# Patient Record
Sex: FEMALE | Race: WHITE | HISPANIC OR LATINO | Employment: FULL TIME | URBAN - METROPOLITAN AREA
[De-identification: names, ages, dates, MRNs, and addresses within clinical notes are randomized per-mention and may not be internally consistent; named-entity substitution may affect disease eponyms.]

---

## 2023-05-12 ENCOUNTER — TELEPHONE (OUTPATIENT)
Dept: PRIMARY CARE | Facility: CLINIC | Age: 38
End: 2023-05-12
Payer: COMMERCIAL

## 2023-05-12 DIAGNOSIS — L30.9 DERMATITIS: Primary | ICD-10-CM

## 2023-05-12 DIAGNOSIS — F41.1 GAD (GENERALIZED ANXIETY DISORDER): ICD-10-CM

## 2023-05-12 DIAGNOSIS — R41.840 CONCENTRATION DEFICIT: ICD-10-CM

## 2023-05-12 RX ORDER — BUPROPION HYDROCHLORIDE 150 MG/1
150 TABLET ORAL EVERY MORNING
Qty: 90 TABLET | Refills: 2 | Status: SHIPPED | OUTPATIENT
Start: 2023-05-12 | End: 2023-08-31 | Stop reason: ALTCHOICE

## 2023-05-12 RX ORDER — CLOTRIMAZOLE AND BETAMETHASONE DIPROPIONATE 10; .64 MG/G; MG/G
1 CREAM TOPICAL 2 TIMES DAILY
Qty: 45 G | Refills: 1 | Status: SHIPPED | OUTPATIENT
Start: 2023-05-12 | End: 2023-09-09

## 2023-08-30 PROBLEM — F41.1 GAD (GENERALIZED ANXIETY DISORDER): Status: ACTIVE | Noted: 2023-08-30

## 2023-08-30 ASSESSMENT — ENCOUNTER SYMPTOMS
BACK PAIN: 0
BRUISES/BLEEDS EASILY: 0
COUGH: 0
CHILLS: 0
FATIGUE: 0
CONFUSION: 0
DYSURIA: 0
HEMATURIA: 0
SHORTNESS OF BREATH: 0
FREQUENCY: 0
SORE THROAT: 0
EYE DISCHARGE: 0
ABDOMINAL PAIN: 0
EYE REDNESS: 0
HALLUCINATIONS: 0
POLYPHAGIA: 0
EYE PAIN: 0
TROUBLE SWALLOWING: 0
HEADACHES: 0
UNEXPECTED WEIGHT CHANGE: 0
DIZZINESS: 0
ADENOPATHY: 0
POLYDIPSIA: 0
WOUND: 0
FEVER: 0
NECK PAIN: 0
PALPITATIONS: 0
VOMITING: 0
APPETITE CHANGE: 0
BLOOD IN STOOL: 0

## 2023-08-30 NOTE — PROGRESS NOTES
"Subjective   Patient ID: Makayla Taylor is a 38 y.o. female who presents for Annual Exam (Pt is here for annual exam ).  Is pt fasting? No   Does pt see any providers other than care team below: linda Light  Name of Dr. Christiana mckeon at  that she saw her 2 years ago when she was pregnant   Did pt see derm -dr griffith? Yes, but she can't remember the name   Did pt have a pap test since 2018;  she is scheduled to see obgyn next month   Did pt ask mom if she got genetic testing done re: breast cancer? She asked mom and she did not get it  Does pt want flu shot? No   Phq9=4 , gad7= 5       NEED SYLVIE FOR LAST PAP-dr pickering did not do one in 2022    No care team member to display    HPI  Last labs-9/2022 ldl 129, cmp nl, tsh nl, cbc nl  Due for labs- all    No results found for: \"CHOL\"  No results found for: \"TRIG\"  No results found for: \"HDL\"  No results found for: \"LDL\"  No results found for: \"TSH\"  No results found for: \"A1C\"  No components found for: \"POCA1C\"  No results found for: \"ALBUR\"  No components found for: \"POCALBUR\"      Other concerns: incr wellbutrin to 150mg 2 a day by self and working better    bps at home- none    ER/urgicare visits in the last year- none  Hospitalizations in the last year- none      last Pap-8/2018; pt thinks had done 2022  H/o abn pap-none    FH ovarian, endometrial, cervical, uterine ca-mat aunt-ovarian    Current birth control method-none  No change in contraception desired    FH br ca-mom, pat aunt      FH colon ca-none      Exercise- chasing kids, takes kids to pool  Diet-2 meals   Body mass index is 20.8 kg/m².    last eye dr appt- contacts, jan 2023  No vision issues    last dental appt- June 2023    BMs-regular  Sleep-able to fall asleep and stay asleep; no snoring or apnea  no cp, swelling, sob, abd pain, n/v/d/c, blood in stool or black stools  STI testing including hiv (age 15-65) and hep c screening (18-79)-declines        Immunization History   Administered Date(s) " Administered    Pfizer Purple Cap SARS-CoV-2 01/10/2022    Tdap vaccine, age 10 years and older (BOOSTRIX) 06/16/2021       fractures in lifetime-nose      FH heart attack, heart surgery-none  FH stroke-none    The ASCVD Risk score (Maral SHULTZ, et al., 2019) failed to calculate for the following reasons:    The 2019 ASCVD risk score is only valid for ages 40 to 79  Coronary Artery Calcium score:  This test is recommended for men 45 or older and women 55 or older without a history of heart disease and have 1 risk factor (high LDL cholesterol, low HDL cholesterol, high blood pressure, smoker (current or past), type 2 diabetes, IBD, lupus, RA, ankylosing spondylitis, psoriasis or family history of  heart disease <55yrs in dad, brother or child or <65yrs in mom, sister, or child.)       Review of Systems   Constitutional:  Negative for appetite change, chills, fatigue, fever and unexpected weight change.   HENT:  Negative for congestion, ear pain, sore throat and trouble swallowing.    Eyes:  Negative for pain, discharge and redness.   Respiratory:  Negative for cough and shortness of breath.    Cardiovascular:  Negative for chest pain and palpitations.   Gastrointestinal:  Negative for abdominal pain, blood in stool and vomiting.   Endocrine: Negative for polydipsia, polyphagia and polyuria.   Genitourinary:  Negative for dysuria, frequency, hematuria and urgency.   Musculoskeletal:  Negative for back pain and neck pain.   Skin:  Negative for rash and wound.   Allergic/Immunologic: Negative for immunocompromised state.   Neurological:  Negative for dizziness, syncope and headaches.   Hematological:  Negative for adenopathy. Does not bruise/bleed easily.   Psychiatric/Behavioral:  Negative for confusion and hallucinations.        Objective   Visit Vitals  /77   Pulse 69   Temp 36.1 °C (96.9 °F)      BP Readings from Last 3 Encounters:   08/31/23 118/77   03/02/23 109/66   09/09/22 108/62     Wt Readings from Last 3  Encounters:   08/31/23 46.7 kg (103 lb)   03/02/23 50.6 kg (111 lb 9.6 oz)   09/09/22 51.9 kg (114 lb 6 oz)           Physical Exam  Constitutional:       General: She is not in acute distress.     Appearance: Normal appearance. She is not ill-appearing.   HENT:      Head: Normocephalic.      Right Ear: Tympanic membrane, ear canal and external ear normal.      Left Ear: Tympanic membrane, ear canal and external ear normal.      Nose: Nose normal.      Mouth/Throat:      Mouth: Mucous membranes are moist.      Pharynx: Oropharynx is clear.   Eyes:      Extraocular Movements: Extraocular movements intact.      Conjunctiva/sclera: Conjunctivae normal.      Pupils: Pupils are equal, round, and reactive to light.   Cardiovascular:      Rate and Rhythm: Normal rate and regular rhythm.      Heart sounds: Normal heart sounds. No murmur heard.  Pulmonary:      Effort: Pulmonary effort is normal. No respiratory distress.      Breath sounds: Normal breath sounds. No wheezing, rhonchi or rales.   Abdominal:      General: Bowel sounds are normal.      Palpations: Abdomen is soft. There is no mass.      Tenderness: There is no abdominal tenderness.   Musculoskeletal:         General: No swelling or tenderness. Normal range of motion.      Cervical back: Normal range of motion and neck supple.      Right lower leg: No edema.      Left lower leg: No edema.   Skin:     General: Skin is warm.      Findings: No rash.   Neurological:      General: No focal deficit present.      Mental Status: She is alert and oriented to person, place, and time.      Cranial Nerves: No cranial nerve deficit.      Motor: No weakness.   Psychiatric:         Mood and Affect: Mood normal.         Behavior: Behavior normal.         Assessment/Plan   Diagnoses and all orders for this visit:  Routine general medical examination at a health care facility  -     Comprehensive Metabolic Panel; Future  -     CBC and Auto Differential; Future  -     Lipid Panel;  Future  -     TSH with reflex to Free T4 if abnormal; Future  At risk for breast cancer  -     Referral to Genetics; Future  INDU (generalized anxiety disorder)  -     buPROPion XL (Wellbutrin XL) 300 mg 24 hr tablet; Take 1 tablet (300 mg) by mouth once daily in the morning. Do not crush, chew, or split.  BMI 20.0-20.9, adult  Other orders  -     Follow Up In Primary Care - Health Maintenance; Future

## 2023-08-31 ENCOUNTER — OFFICE VISIT (OUTPATIENT)
Dept: PRIMARY CARE | Facility: CLINIC | Age: 38
End: 2023-08-31
Payer: COMMERCIAL

## 2023-08-31 VITALS
BODY MASS INDEX: 20.76 KG/M2 | WEIGHT: 103 LBS | DIASTOLIC BLOOD PRESSURE: 77 MMHG | OXYGEN SATURATION: 98 % | TEMPERATURE: 96.9 F | SYSTOLIC BLOOD PRESSURE: 118 MMHG | HEIGHT: 59 IN | HEART RATE: 69 BPM

## 2023-08-31 DIAGNOSIS — Z91.89 AT RISK FOR BREAST CANCER: ICD-10-CM

## 2023-08-31 DIAGNOSIS — R29.3 POSTURE ABNORMALITY: ICD-10-CM

## 2023-08-31 DIAGNOSIS — Z00.00 ROUTINE GENERAL MEDICAL EXAMINATION AT A HEALTH CARE FACILITY: Primary | ICD-10-CM

## 2023-08-31 DIAGNOSIS — F41.1 GAD (GENERALIZED ANXIETY DISORDER): ICD-10-CM

## 2023-08-31 PROCEDURE — 3008F BODY MASS INDEX DOCD: CPT | Performed by: NURSE PRACTITIONER

## 2023-08-31 PROCEDURE — 1036F TOBACCO NON-USER: CPT | Performed by: NURSE PRACTITIONER

## 2023-08-31 PROCEDURE — 99395 PREV VISIT EST AGE 18-39: CPT | Performed by: NURSE PRACTITIONER

## 2023-08-31 RX ORDER — BUPROPION HYDROCHLORIDE 300 MG/1
300 TABLET ORAL EVERY MORNING
Qty: 30 TABLET | Refills: 5 | Status: SHIPPED | OUTPATIENT
Start: 2023-08-31 | End: 2024-02-27

## 2023-08-31 ASSESSMENT — ANXIETY QUESTIONNAIRES
6. BECOMING EASILY ANNOYED OR IRRITABLE: NEARLY EVERY DAY
2. NOT BEING ABLE TO STOP OR CONTROL WORRYING: SEVERAL DAYS
7. FEELING AFRAID AS IF SOMETHING AWFUL MIGHT HAPPEN: NOT AT ALL
4. TROUBLE RELAXING: NOT AT ALL
GAD7 TOTAL SCORE: 5
5. BEING SO RESTLESS THAT IT IS HARD TO SIT STILL: NOT AT ALL
IF YOU CHECKED OFF ANY PROBLEMS ON THIS QUESTIONNAIRE, HOW DIFFICULT HAVE THESE PROBLEMS MADE IT FOR YOU TO DO YOUR WORK, TAKE CARE OF THINGS AT HOME, OR GET ALONG WITH OTHER PEOPLE: SOMEWHAT DIFFICULT
3. WORRYING TOO MUCH ABOUT DIFFERENT THINGS: NOT AT ALL
1. FEELING NERVOUS, ANXIOUS, OR ON EDGE: SEVERAL DAYS

## 2023-08-31 ASSESSMENT — COLUMBIA-SUICIDE SEVERITY RATING SCALE - C-SSRS
2. HAVE YOU ACTUALLY HAD ANY THOUGHTS OF KILLING YOURSELF?: NO
6. HAVE YOU EVER DONE ANYTHING, STARTED TO DO ANYTHING, OR PREPARED TO DO ANYTHING TO END YOUR LIFE?: NO
1. IN THE PAST MONTH, HAVE YOU WISHED YOU WERE DEAD OR WISHED YOU COULD GO TO SLEEP AND NOT WAKE UP?: NO

## 2023-08-31 ASSESSMENT — PATIENT HEALTH QUESTIONNAIRE - PHQ9
4. FEELING TIRED OR HAVING LITTLE ENERGY: SEVERAL DAYS
SUM OF ALL RESPONSES TO PHQ9 QUESTIONS 1 AND 2: 0
6. FEELING BAD ABOUT YOURSELF - OR THAT YOU ARE A FAILURE OR HAVE LET YOURSELF OR YOUR FAMILY DOWN: NOT AT ALL
1. LITTLE INTEREST OR PLEASURE IN DOING THINGS: NOT AT ALL
2. FEELING DOWN, DEPRESSED OR HOPELESS: NOT AT ALL
10. IF YOU CHECKED OFF ANY PROBLEMS, HOW DIFFICULT HAVE THESE PROBLEMS MADE IT FOR YOU TO DO YOUR WORK, TAKE CARE OF THINGS AT HOME, OR GET ALONG WITH OTHER PEOPLE: SOMEWHAT DIFFICULT
9. THOUGHTS THAT YOU WOULD BE BETTER OFF DEAD, OR OF HURTING YOURSELF: NOT AT ALL
8. MOVING OR SPEAKING SO SLOWLY THAT OTHER PEOPLE COULD HAVE NOTICED. OR THE OPPOSITE, BEING SO FIGETY OR RESTLESS THAT YOU HAVE BEEN MOVING AROUND A LOT MORE THAN USUAL: NOT AT ALL
SUM OF ALL RESPONSES TO PHQ QUESTIONS 1-9: 4
7. TROUBLE CONCENTRATING ON THINGS, SUCH AS READING THE NEWSPAPER OR WATCHING TELEVISION: NEARLY EVERY DAY
5. POOR APPETITE OR OVEREATING: NOT AT ALL
3. TROUBLE FALLING OR STAYING ASLEEP OR SLEEPING TOO MUCH: NOT AT ALL

## 2023-08-31 NOTE — PATIENT INSTRUCTIONS
Increase bupropion to 300mg a day    See genetics from breast cancer genetic testing    Handouts given to pt:  physical handout      I recommend signing up for MyChart.    Labs:    You can use the lab in our building when fasting. The hrs are: Monday-Friday, 7 a.m. - 5 p.m., Saturday 8 a.m. - 12 noon.   No appt needed, BUT YOU DO NEED THE PAPER ORDER.    Fasting is no food, drink, gum or mints other than water for 12 hrs.   Results will be back in 2-3 business days for most labs. It is always recommended for any orders (labs, xrays, ultrasounds,MRI, ct scan, procedures etc) to check with your insurance provider for expected costs or expenses to you.         You will get your results via phone from my medical assistant if you do not have MyChart.  OR  You will get your results via [employer]    If a result is urgent, I will call to speak to you.    Vaccines:  Up to date    General recommendations:  Exercise-cardio 4-5d/wk 30min each day  Diet-Breakfast-toast (my favorite Hilda Hui Delighful Multigrain or Masood's Killer Bread Good Seed thin-sliced)/bagel/English muffin-whole wheat flour as a 1st ingredient or cereal/oatmeal/granola bar-fiber 4g or more or protein like eggs or peanut butter; optional veggies  Lunch-protein, 1/2c carb or 2 slices bread, veg 1c  Dinner-protein, fist sized carb, veg 1c  Fruit 2 a day  Dairy 2 a day-milk, soy milk, almond milk, cheese, yogurt, cottage cheese  Snacks-Protein-hard boiled egg, nuts (walnuts/almonds/pecans/pistachios 1/4c), hummus, beef/deer jerky or meat sticks; vegetable, fruit, dairy-milk(1%, skim, almond, soy)/cheese (not a lot of cheddar)/yogurt (Greek is best-my favorite Dannon Fruit on the Bottom Greek)/cottage cheese 2%; triscuits/ popcorn/wheat thins have a lot of fiber; follow serving size on bag/box/container  increase water  Limit alcohol to 1 drink per day for women and 2 drinks per day for men (1 drink=12oz beer or 5oz wine or 1 1/2oz liquor)  Calcium: 500mg 1 twice a  day if age 50 and younger and 600mg 1 twice a day if over age 50 (calcium citrate can be taken without food)  Vitamin D: 800-5000 IU/day  Limit salt to <2300mg a day if age 50 and under and <1500mg a day if over age 50/have high bp or diabetes or kidney disease  Recommend folate for childbearing age women 0.4mg per day (can be found in a multivitamin)  Recommend 18mg/dL of iron a day if age 50 and under and 8mg/dL a day if over age 50; take on an empty stomach at bedtime  Use sunscreen   Wear seatbelt  Recommend safe sex practices: using condoms everytime you have sex, discuss with a new partner about their past partners/history of STDs/drug use, avoid drinking alcohol or using drugs as this increases the chance that you will participate in high-risk sex, for oral sex help protect your mouth by having your partner use a condom (male or female), women should not douche after sex, be aware of your partner's body and your body-look for signs of a sore, blister, rash, or discharge, and have regular exams and periodic tests for STDs.  No distracted driving  No driving when under influence of substances  Wear a seatbelt  Eye dr every 1-2yrs  Dentist every 6-12 mon  Tetanus shot every 10yrs  Recommend flu vaccine in the fall  Appt in  1 year for physical      I will communicate with you via [employer] regarding messages and results. If you need help with this, you can call the support line at [phone].    IT WAS A PLEASURE TO SEE YOU TODAY. THANK YOU FOR CHOOSING US FOR YOUR HEALTHCARE NEEDS.

## 2023-09-30 LAB
NON-UH HIE A/G RATIO: 1.4
NON-UH HIE ALB: 4.4 G/DL (ref 3.4–5)
NON-UH HIE ALK PHOS: 54 UNIT/L (ref 46–116)
NON-UH HIE BASO COUNT: 0.05 X1000 (ref 0–0.2)
NON-UH HIE BASOS %: 0.7 %
NON-UH HIE BILIRUBIN, TOTAL: 0.6 MG/DL (ref 0.2–1)
NON-UH HIE BUN/CREAT RATIO: 10
NON-UH HIE BUN: 9 MG/DL (ref 9–23)
NON-UH HIE CALCIUM: 9.5 MG/DL (ref 8.7–10.4)
NON-UH HIE CALCULATED LDL CHOLESTEROL: 122 MG/DL (ref 60–130)
NON-UH HIE CALCULATED OSMOLALITY: 274 MOSM/KG (ref 275–295)
NON-UH HIE CHLORIDE: 105 MMOL/L (ref 98–107)
NON-UH HIE CHOLESTEROL: 200 MG/DL (ref 100–200)
NON-UH HIE CO2, VENOUS: 26 MMOL/L (ref 20–31)
NON-UH HIE CREATININE: 0.9 MG/DL (ref 0.5–0.8)
NON-UH HIE DIFF?: NO
NON-UH HIE EOS COUNT: 0.04 X1000 (ref 0–0.5)
NON-UH HIE EOSIN %: 0.6 %
NON-UH HIE GFR AA: >60
NON-UH HIE GLOBULIN: 3.2 G/DL
NON-UH HIE GLOMERULAR FILTRATION RATE: >60 ML/MIN/1.73M?
NON-UH HIE GLUCOSE: 98 MG/DL (ref 74–106)
NON-UH HIE GOT: 18 UNIT/L (ref 15–37)
NON-UH HIE GPT: 23 UNIT/L (ref 10–49)
NON-UH HIE HCT: 42.7 % (ref 36–46)
NON-UH HIE HDL CHOLESTEROL: 61 MG/DL (ref 40–60)
NON-UH HIE HGB: 14.5 G/DL (ref 12–16)
NON-UH HIE INSTR WBC: 7
NON-UH HIE K: 3.9 MMOL/L (ref 3.5–5.1)
NON-UH HIE LYMPH %: 32.4 %
NON-UH HIE LYMPH COUNT: 2.26 X1000 (ref 1.2–4.8)
NON-UH HIE MCH: 30.9 PG (ref 27–34)
NON-UH HIE MCHC: 34 G/DL (ref 32–37)
NON-UH HIE MCV: 91 FL (ref 80–100)
NON-UH HIE MONO %: 6.2 %
NON-UH HIE MONO COUNT: 0.43 X1000 (ref 0.1–1)
NON-UH HIE MPV: 8.5 FL (ref 7.4–10.4)
NON-UH HIE NA: 138 MMOL/L (ref 135–145)
NON-UH HIE NEUTROPHIL %: 60.2 %
NON-UH HIE NEUTROPHIL COUNT (ANC): 4.2 X1000 (ref 1.4–8.8)
NON-UH HIE NUCLEATED RBC: 0 /100WBC
NON-UH HIE PLATELET: 287 X10 (ref 150–450)
NON-UH HIE RBC: 4.69 X10 (ref 4.2–5.4)
NON-UH HIE RDW: 13.1 % (ref 11.5–14.5)
NON-UH HIE TOTAL CHOL/HDL CHOL RATIO: 3.3
NON-UH HIE TOTAL PROTEIN: 7.6 G/DL (ref 5.7–8.2)
NON-UH HIE TRIGLYCERIDES: 83 MG/DL (ref 30–150)
NON-UH HIE TSH: 1.46 UIU/ML (ref 0.55–4.78)
NON-UH HIE WBC: 7 X10 (ref 4.5–11)

## 2023-10-02 ENCOUNTER — TELEPHONE (OUTPATIENT)
Dept: PRIMARY CARE | Facility: CLINIC | Age: 38
End: 2023-10-02
Payer: COMMERCIAL

## 2023-10-02 NOTE — TELEPHONE ENCOUNTER
Trigs and hdl normal.   Ldl high at 122. Goal <100. Decr fats and incr fiber.   All cholesterol labs normal.   TSH (thyroid test) was normal.   CBC (complete blood count) was normal which looks at infection and anemia markers.

## 2024-05-12 ENCOUNTER — OFFICE VISIT (OUTPATIENT)
Dept: URGENT CARE | Facility: CLINIC | Age: 39
End: 2024-05-12
Payer: COMMERCIAL

## 2024-05-12 VITALS
SYSTOLIC BLOOD PRESSURE: 110 MMHG | OXYGEN SATURATION: 99 % | TEMPERATURE: 97.9 F | RESPIRATION RATE: 18 BRPM | HEART RATE: 69 BPM | DIASTOLIC BLOOD PRESSURE: 72 MMHG | BODY MASS INDEX: 24.24 KG/M2 | WEIGHT: 120 LBS

## 2024-05-12 DIAGNOSIS — J01.90 ACUTE SINUSITIS, RECURRENCE NOT SPECIFIED, UNSPECIFIED LOCATION: Primary | ICD-10-CM

## 2024-05-12 PROCEDURE — 3008F BODY MASS INDEX DOCD: CPT | Performed by: PHYSICIAN ASSISTANT

## 2024-05-12 PROCEDURE — 99203 OFFICE O/P NEW LOW 30 MIN: CPT | Performed by: PHYSICIAN ASSISTANT

## 2024-05-12 PROCEDURE — 1036F TOBACCO NON-USER: CPT | Performed by: PHYSICIAN ASSISTANT

## 2024-05-12 RX ORDER — AMOXICILLIN AND CLAVULANATE POTASSIUM 875; 125 MG/1; MG/1
1 TABLET, FILM COATED ORAL 2 TIMES DAILY
Qty: 20 TABLET | Refills: 0 | Status: SHIPPED | OUTPATIENT
Start: 2024-05-12 | End: 2024-05-22

## 2024-05-12 RX ORDER — BENZONATATE 100 MG/1
100 CAPSULE ORAL 3 TIMES DAILY PRN
Qty: 30 CAPSULE | Refills: 0 | Status: SHIPPED | OUTPATIENT
Start: 2024-05-12 | End: 2024-05-22

## 2024-05-12 ASSESSMENT — ENCOUNTER SYMPTOMS
SINUS COMPLAINT: 1
SORE THROAT: 1
COUGH: 1
SINUS PRESSURE: 1

## 2024-05-12 NOTE — PROGRESS NOTES
Subjective   Patient ID: Makayla Taylor is a 38 y.o. female.    Patient is a 38-year-old female who complains of ongoing congestion, sinus pressure and cough that she has been experiencing for the past 3 weeks.  Patient states that her symptoms are markedly worse when she lays down at night attempting to sleep.  Patient reports no fever, chills or myalgia.  Patient has no history of asthma or COPD and does not smoke.  Patient reports mild throat irritation that she believes is secondary to frequent coughing.      Sinus Problem  Associated symptoms: congestion, cough and sore throat    The following portions of the chart were reviewed this encounter and updated as appropriate:       Review of Systems   HENT:  Positive for congestion, sinus pressure and sore throat.    Respiratory:  Positive for cough.    All other systems reviewed and are negative.  Objective   Physical Exam  Vitals and nursing note reviewed.   Constitutional:       Appearance: Normal appearance. She is normal weight.   HENT:      Head: Normocephalic and atraumatic.      Right Ear: Tympanic membrane, ear canal and external ear normal.      Left Ear: Tympanic membrane, ear canal and external ear normal.      Nose: Nose normal. No congestion or rhinorrhea.      Mouth/Throat:      Mouth: Mucous membranes are moist.      Pharynx: Oropharynx is clear. No oropharyngeal exudate or posterior oropharyngeal erythema.   Eyes:      Extraocular Movements: Extraocular movements intact.      Conjunctiva/sclera: Conjunctivae normal.      Pupils: Pupils are equal, round, and reactive to light.   Cardiovascular:      Rate and Rhythm: Normal rate and regular rhythm.      Pulses: Normal pulses.      Heart sounds: Normal heart sounds.   Pulmonary:      Effort: Pulmonary effort is normal. No respiratory distress.      Breath sounds: Normal breath sounds. No stridor. No wheezing, rhonchi or rales.   Musculoskeletal:      Cervical back: Normal range of motion and neck  supple.   Skin:     General: Skin is warm and dry.      Capillary Refill: Capillary refill takes less than 2 seconds.   Neurological:      General: No focal deficit present.      Mental Status: She is alert and oriented to person, place, and time.   Psychiatric:         Mood and Affect: Mood normal.         Behavior: Behavior normal.         Thought Content: Thought content normal.         Judgment: Judgment normal.     Assessment/Plan   Physical exam findings as noted above.  Patient was provided with descriptions for Augmentin 175-125 mg and Tessalon 100 mg.  Supportive care instructions were discussed and the patient verbalizes good understanding of same.    CLINICAL IMPRESSION:  Acute Sinusitis    Diagnoses and all orders for this visit:  Acute sinusitis, recurrence not specified, unspecified location  -     amoxicillin-pot clavulanate (Augmentin) 875-125 mg tablet; Take 1 tablet by mouth 2 times a day for 10 days.  -     benzonatate (Tessalon Perles) 100 mg capsule; Take 1 capsule (100 mg) by mouth 3 times a day as needed for cough for up to 10 days.    Patient disposition: Home

## 2024-10-30 ENCOUNTER — HOSPITAL ENCOUNTER (OUTPATIENT)
Dept: RADIOLOGY | Facility: HOSPITAL | Age: 39
Discharge: HOME | End: 2024-10-30

## 2024-10-30 DIAGNOSIS — R92.2 INCONCLUSIVE MAMMOGRAM: ICD-10-CM

## 2024-10-30 PROCEDURE — A9575 INJ GADOTERATE MEGLUMI 0.1ML: HCPCS

## 2024-10-30 PROCEDURE — 6100000003 BI MR BREAST BILATERAL WITH CONTRAST FAST SCREENING SELF PAY

## 2024-10-30 PROCEDURE — 2550000001 HC RX 255 CONTRASTS

## 2024-10-30 RX ORDER — GADOTERATE MEGLUMINE 376.9 MG/ML
11 INJECTION INTRAVENOUS
Status: COMPLETED | OUTPATIENT
Start: 2024-10-30 | End: 2024-10-30

## 2024-10-31 ENCOUNTER — HOSPITAL ENCOUNTER (OUTPATIENT)
Dept: RADIOLOGY | Facility: EXTERNAL LOCATION | Age: 39
Discharge: HOME | End: 2024-10-31

## 2024-11-01 ENCOUNTER — APPOINTMENT (OUTPATIENT)
Dept: RADIOLOGY | Facility: HOSPITAL | Age: 39
End: 2024-11-01

## 2025-01-02 PROBLEM — J01.90 ACUTE SINUSITIS: Status: RESOLVED | Noted: 2024-05-12 | Resolved: 2025-01-02

## 2025-01-02 ASSESSMENT — ENCOUNTER SYMPTOMS
HEADACHES: 0
EYE REDNESS: 0
CHILLS: 0
EYE DISCHARGE: 0
CONFUSION: 0
WOUND: 0
UNEXPECTED WEIGHT CHANGE: 0
EYE PAIN: 0
DIZZINESS: 0
FEVER: 0
APPETITE CHANGE: 0
SORE THROAT: 0
ADENOPATHY: 0
BRUISES/BLEEDS EASILY: 0
DYSURIA: 0
POLYDIPSIA: 0
TROUBLE SWALLOWING: 0
VOMITING: 0
BLOOD IN STOOL: 0
HALLUCINATIONS: 0
FREQUENCY: 0
HEMATURIA: 0
SHORTNESS OF BREATH: 0
PALPITATIONS: 0
BACK PAIN: 0
POLYPHAGIA: 0
FATIGUE: 0
COUGH: 0
NECK PAIN: 0

## 2025-01-02 NOTE — PROGRESS NOTES
"Subjective   Patient ID: Makayla Taylor is a 39 y.o. female who presents for Annual Exam.    Is pt fasting? Yes   Does pt see any providers other than care team below:   gladis Carreon    Does pt want flu shot?  No    Any dose adj needed on the bupropion? Hasn't been taking >1mon  Any concerns or questions today   No     Phq9=9 , gad7 = 11    NEED SYLVIE FOR LAST PAP-dr carreon    No care team member to display    HPI  Last labs-9/2023 Trigs and hdl normal.  Ldl high at 122. Goal <100. Decr fats and incr fiber.  All cholesterol labs normal.  TSH (thyroid test) was normal.  CBC (complete blood count) was normal which looks at infection and anemia markers.  9/2022 ldl 129, cmp nl, tsh nl, cbc nl  Due for labs- all    No results found for: \"CHOL\"  No results found for: \"TRIG\"  No results found for: \"HDL\"  No results found for: \"LDL\"  No results found for: \"TSH\"  No results found for: \"A1C\"  No components found for: \"POCA1C\"  No results found for: \"ALBUR\"  No components found for: \"POCALBUR\"      Other concerns:got a new job in the summer; stopped bupropion >1mon ago; not sure if needs to be back on it  Pt wants to have another baby but  does not    Some days cramping before stool lower abdomen since holidays  Diarrhea last 2 stools        bps at home- none    ER/urgicare visits in the last year- 5/2024 sinus  Hospitalizations in the last year- none      last Pap-dr carreon; 9/21/23; due 9/2028  H/o abn pap-none    FH ovarian, endometrial, cervical, uterine ca-mat aunt-ovarian    Current birth control method-none  No change in contraception desired    Last mammo-10/9/24  FH br ca-mom, pat aunt  Mom did not get genetic testing done      FH colon ca-none      Exercise- chasing kids, takes kids to pool, trying to walk, has elliptical but it is in the garage  Diet-2 meals   Body mass index is 23.15 kg/m².    last eye dr appt- contacts, last yr  No vision issues    last dental appt- nov 2024    BMs-regular  Sleep-able to " fall asleep but hard to stay asleep at times no snoring or apnea  no cp, swelling, sob, abd pain, n/v/d/c, blood in stool or black stools  STI testing including hiv (age 15-65) and hep c screening (18-79)-declines        Immunization History   Administered Date(s) Administered    COVID-19, mRNA, LNP-S, PF, 30 mcg/0.3 mL dose 01/10/2022    Pfizer Gray Cap SARS-CoV-2 01/31/2022    Tdap vaccine, age 7 year and older (BOOSTRIX, ADACEL) 06/16/2021       fractures in lifetime-nose   osteoporosis-mom    FH heart attack, heart surgery-mat cousin -mi x2 in 1wk   stroke-none    The ASCVD Risk score (Maral SHULTZ, et al., 2019) failed to calculate for the following reasons:    Cannot find a previous HDL lab    Cannot find a previous total cholesterol lab  Coronary Artery Calcium score:  This test is recommended for men 45 or older and women 55 or older without a history of heart disease and have 1 risk factor (high LDL cholesterol, low HDL cholesterol, high blood pressure, smoker (current or past), type 2 diabetes, IBD, lupus, RA, ankylosing spondylitis, psoriasis or family history of  heart disease <55yrs in dad, brother or child or <65yrs in mom, sister, or child.)       Review of Systems   Constitutional:  Negative for appetite change, chills, fatigue, fever and unexpected weight change.   HENT:  Negative for congestion, ear pain, sore throat and trouble swallowing.    Eyes:  Negative for pain, discharge and redness.   Respiratory:  Negative for cough and shortness of breath.    Cardiovascular:  Negative for chest pain and palpitations.   Gastrointestinal:  Positive for abdominal pain. Negative for blood in stool and vomiting.   Endocrine: Negative for polydipsia, polyphagia and polyuria.   Genitourinary:  Negative for dysuria, frequency, hematuria and urgency.   Musculoskeletal:  Negative for back pain and neck pain.   Skin:  Negative for rash and wound.   Allergic/Immunologic: Negative for immunocompromised state.    Neurological:  Negative for dizziness, syncope and headaches.   Hematological:  Negative for adenopathy. Does not bruise/bleed easily.   Psychiatric/Behavioral:  Negative for confusion and hallucinations.        Objective   Visit Vitals  /75   Pulse 71   Temp 36.4 °C (97.5 °F)        BP Readings from Last 3 Encounters:   01/06/25 107/75   05/12/24 110/72   08/31/23 118/77     Wt Readings from Last 3 Encounters:   01/06/25 52 kg (114 lb 9.6 oz)   05/12/24 54.4 kg (120 lb)   08/31/23 46.7 kg (103 lb)           Physical Exam  Constitutional:       General: She is not in acute distress.     Appearance: Normal appearance. She is not ill-appearing.   HENT:      Head: Normocephalic.      Right Ear: Tympanic membrane, ear canal and external ear normal.      Left Ear: Tympanic membrane, ear canal and external ear normal.      Nose: Nose normal.      Mouth/Throat:      Mouth: Mucous membranes are moist.      Pharynx: Oropharynx is clear.   Eyes:      Extraocular Movements: Extraocular movements intact.      Conjunctiva/sclera: Conjunctivae normal.      Pupils: Pupils are equal, round, and reactive to light.   Cardiovascular:      Rate and Rhythm: Normal rate and regular rhythm.      Heart sounds: Normal heart sounds. No murmur heard.  Pulmonary:      Effort: Pulmonary effort is normal. No respiratory distress.      Breath sounds: Normal breath sounds. No wheezing, rhonchi or rales.   Abdominal:      General: Bowel sounds are normal.      Palpations: Abdomen is soft. There is no mass.      Tenderness: There is no abdominal tenderness.   Musculoskeletal:         General: No swelling or tenderness. Normal range of motion.      Cervical back: Normal range of motion and neck supple.      Right lower leg: No edema.      Left lower leg: No edema.   Skin:     General: Skin is warm.      Findings: No rash.   Neurological:      General: No focal deficit present.      Mental Status: She is alert and oriented to person, place,  and time.      Cranial Nerves: No cranial nerve deficit.      Motor: No weakness.   Psychiatric:         Mood and Affect: Mood normal.         Behavior: Behavior normal.         Assessment/Plan   Diagnoses and all orders for this visit:  Routine general medical examination at a health care facility  -     Comprehensive Metabolic Panel; Future  -     CBC and Auto Differential; Future  -     Lipid Panel; Future  -     TSH with reflex to Free T4 if abnormal; Future  INDU (generalized anxiety disorder)  -     Referral to Psychology; Future  BMI 23.0-23.9, adult  Abdominal cramping  -     dicyclomine (Bentyl) 20 mg tablet; Take 1 tablet (20 mg) by mouth 2 times a day for 14 days.  Mild episode of recurrent major depressive disorder (CMS-HCC)  -     Referral to Psychology; Future  Other orders  -     Follow Up In Primary Care - Health Maintenance; Future

## 2025-01-06 ENCOUNTER — APPOINTMENT (OUTPATIENT)
Dept: PRIMARY CARE | Facility: CLINIC | Age: 40
End: 2025-01-06
Payer: COMMERCIAL

## 2025-01-06 VITALS
HEIGHT: 59 IN | TEMPERATURE: 97.5 F | OXYGEN SATURATION: 97 % | HEART RATE: 71 BPM | SYSTOLIC BLOOD PRESSURE: 107 MMHG | DIASTOLIC BLOOD PRESSURE: 75 MMHG | WEIGHT: 114.6 LBS | BODY MASS INDEX: 23.1 KG/M2

## 2025-01-06 DIAGNOSIS — F41.1 GAD (GENERALIZED ANXIETY DISORDER): ICD-10-CM

## 2025-01-06 DIAGNOSIS — R10.9 ABDOMINAL CRAMPING: ICD-10-CM

## 2025-01-06 DIAGNOSIS — F33.0 MILD EPISODE OF RECURRENT MAJOR DEPRESSIVE DISORDER (CMS-HCC): ICD-10-CM

## 2025-01-06 DIAGNOSIS — Z00.00 ROUTINE GENERAL MEDICAL EXAMINATION AT A HEALTH CARE FACILITY: Primary | ICD-10-CM

## 2025-01-06 LAB
NON-UH HIE A/G RATIO: 1
NON-UH HIE ALB: 3.9 G/DL (ref 3.4–5)
NON-UH HIE ALK PHOS: 59 UNIT/L (ref 45–117)
NON-UH HIE BASO COUNT: 0.04 X1000 (ref 0–0.2)
NON-UH HIE BASOS %: 0.7 %
NON-UH HIE BILIRUBIN, TOTAL: 0.5 MG/DL (ref 0.3–1.2)
NON-UH HIE BUN/CREAT RATIO: 12.9
NON-UH HIE BUN: 9 MG/DL (ref 9–23)
NON-UH HIE CALCIUM: 9.7 MG/DL (ref 8.7–10.4)
NON-UH HIE CALCULATED LDL CHOLESTEROL: 167 MG/DL (ref 60–130)
NON-UH HIE CALCULATED OSMOLALITY: 274 MOSM/KG (ref 275–295)
NON-UH HIE CHLORIDE: 105 MMOL/L (ref 98–107)
NON-UH HIE CHOLESTEROL: 245 MG/DL (ref 100–200)
NON-UH HIE CO2, VENOUS: 28 MMOL/L (ref 20–31)
NON-UH HIE CREATININE: 0.7 MG/DL (ref 0.5–0.8)
NON-UH HIE DIFF?: NO
NON-UH HIE EOS COUNT: 0.07 X1000 (ref 0–0.5)
NON-UH HIE EOSIN %: 1.2 %
NON-UH HIE GFR AA: >60
NON-UH HIE GLOBULIN: 3.8 G/DL
NON-UH HIE GLOMERULAR FILTRATION RATE: >60 ML/MIN/1.73M?
NON-UH HIE GLUCOSE: 95 MG/DL (ref 74–106)
NON-UH HIE GOT: 25 UNIT/L (ref 15–37)
NON-UH HIE GPT: 37 UNIT/L (ref 10–49)
NON-UH HIE HCT: 41.4 % (ref 36–46)
NON-UH HIE HDL CHOLESTEROL: 60 MG/DL (ref 40–60)
NON-UH HIE HGB: 14.4 G/DL (ref 12–16)
NON-UH HIE INSTR WBC: 5.6
NON-UH HIE K: 3.8 MMOL/L (ref 3.5–5.1)
NON-UH HIE LYMPH %: 45 %
NON-UH HIE LYMPH COUNT: 2.51 X1000 (ref 1.2–4.8)
NON-UH HIE MCH: 31.1 PG (ref 27–34)
NON-UH HIE MCHC: 34.7 G/DL (ref 32–37)
NON-UH HIE MCV: 89.8 FL (ref 80–100)
NON-UH HIE MONO %: 7.2 %
NON-UH HIE MONO COUNT: 0.4 X1000 (ref 0.1–1)
NON-UH HIE MPV: 8.1 FL (ref 7.4–10.4)
NON-UH HIE NA: 138 MMOL/L (ref 135–145)
NON-UH HIE NEUTROPHIL %: 45.9 %
NON-UH HIE NEUTROPHIL COUNT (ANC): 2.56 X1000 (ref 1.4–8.8)
NON-UH HIE NUCLEATED RBC: 0 /100WBC
NON-UH HIE PLATELET: 292 X10 (ref 150–450)
NON-UH HIE RBC: 4.61 X10 (ref 4.2–5.4)
NON-UH HIE RDW: 13 % (ref 11.5–14.5)
NON-UH HIE TOTAL CHOL/HDL CHOL RATIO: 4.1
NON-UH HIE TOTAL PROTEIN: 7.7 G/DL (ref 5.7–8.2)
NON-UH HIE TRIGLYCERIDES: 90 MG/DL (ref 30–150)
NON-UH HIE TSH: 1.22 UIU/ML (ref 0.55–4.78)
NON-UH HIE WBC: 5.6 X10 (ref 4.5–11)

## 2025-01-06 PROCEDURE — 99396 PREV VISIT EST AGE 40-64: CPT | Performed by: NURSE PRACTITIONER

## 2025-01-06 PROCEDURE — 1036F TOBACCO NON-USER: CPT | Performed by: NURSE PRACTITIONER

## 2025-01-06 PROCEDURE — 3008F BODY MASS INDEX DOCD: CPT | Performed by: NURSE PRACTITIONER

## 2025-01-06 PROCEDURE — 99213 OFFICE O/P EST LOW 20 MIN: CPT | Performed by: NURSE PRACTITIONER

## 2025-01-06 RX ORDER — DICYCLOMINE HYDROCHLORIDE 20 MG/1
20 TABLET ORAL 2 TIMES DAILY
Qty: 28 TABLET | Refills: 0 | Status: SHIPPED | OUTPATIENT
Start: 2025-01-06 | End: 2025-01-20

## 2025-01-06 ASSESSMENT — PATIENT HEALTH QUESTIONNAIRE - PHQ9
SUM OF ALL RESPONSES TO PHQ9 QUESTIONS 1 AND 2: 3
2. FEELING DOWN, DEPRESSED OR HOPELESS: MORE THAN HALF THE DAYS
5. POOR APPETITE OR OVEREATING: NOT AT ALL
SUM OF ALL RESPONSES TO PHQ QUESTIONS 1-9: 9
9. THOUGHTS THAT YOU WOULD BE BETTER OFF DEAD, OR OF HURTING YOURSELF: NOT AT ALL
4. FEELING TIRED OR HAVING LITTLE ENERGY: SEVERAL DAYS
6. FEELING BAD ABOUT YOURSELF - OR THAT YOU ARE A FAILURE OR HAVE LET YOURSELF OR YOUR FAMILY DOWN: NOT AT ALL
7. TROUBLE CONCENTRATING ON THINGS, SUCH AS READING THE NEWSPAPER OR WATCHING TELEVISION: MORE THAN HALF THE DAYS
10. IF YOU CHECKED OFF ANY PROBLEMS, HOW DIFFICULT HAVE THESE PROBLEMS MADE IT FOR YOU TO DO YOUR WORK, TAKE CARE OF THINGS AT HOME, OR GET ALONG WITH OTHER PEOPLE: SOMEWHAT DIFFICULT
3. TROUBLE FALLING OR STAYING ASLEEP OR SLEEPING TOO MUCH: MORE THAN HALF THE DAYS
8. MOVING OR SPEAKING SO SLOWLY THAT OTHER PEOPLE COULD HAVE NOTICED. OR THE OPPOSITE, BEING SO FIGETY OR RESTLESS THAT YOU HAVE BEEN MOVING AROUND A LOT MORE THAN USUAL: SEVERAL DAYS
1. LITTLE INTEREST OR PLEASURE IN DOING THINGS: SEVERAL DAYS

## 2025-01-06 ASSESSMENT — ANXIETY QUESTIONNAIRES
6. BECOMING EASILY ANNOYED OR IRRITABLE: NEARLY EVERY DAY
IF YOU CHECKED OFF ANY PROBLEMS ON THIS QUESTIONNAIRE, HOW DIFFICULT HAVE THESE PROBLEMS MADE IT FOR YOU TO DO YOUR WORK, TAKE CARE OF THINGS AT HOME, OR GET ALONG WITH OTHER PEOPLE: SOMEWHAT DIFFICULT
7. FEELING AFRAID AS IF SOMETHING AWFUL MIGHT HAPPEN: SEVERAL DAYS
1. FEELING NERVOUS, ANXIOUS, OR ON EDGE: MORE THAN HALF THE DAYS
5. BEING SO RESTLESS THAT IT IS HARD TO SIT STILL: NOT AT ALL
4. TROUBLE RELAXING: SEVERAL DAYS
GAD7 TOTAL SCORE: 11
3. WORRYING TOO MUCH ABOUT DIFFERENT THINGS: MORE THAN HALF THE DAYS
2. NOT BEING ABLE TO STOP OR CONTROL WORRYING: MORE THAN HALF THE DAYS

## 2025-01-06 ASSESSMENT — ENCOUNTER SYMPTOMS: ABDOMINAL PAIN: 1

## 2025-01-06 NOTE — PATIENT INSTRUCTIONS
Let me know if you need to go back on the bupropion    Dicyclomine to reset abdomen x 2wks.  Next: gi dr    Valerian root-help to stay asleep    See therapist        Handouts given to pt:  physical handout        Labs- No appt needed:    You can use the lab in our building when fasting. The hrs are: Mon-Fri 7a-330p.  No Saturday hrs. Bring the paper order.   OR   [facility] Mon-Fri 7a-12p. No Saturday hrs. Bring the paper order.  OR   [facility] Mon-Fri 630a-530p or Sat 6:30a-12p. Bring the paper order  OR  [facility] Mon-FrI 7a-5p, Sat 8a-12p  [facility] Mon-Fri 730a-4p, Sat  8a-12p  [facility] [address] Mon-Thurs 630a-6p , Fri 630a-4p, Sat 8a-12p  [facility] [address] Mon-Fri 7a-630p and Sat. 7a-3p    Fasting is no food, drink, gum or mints other than water for 12 hrs.   Results will be back in 2-3 business days for most labs. It is always recommended for any orders (labs, xrays, ultrasounds,MRI, ct scan, procedures etc) to check with your insurance provider for expected costs or expenses to you.         You will get your results via phone from my medical assistant if you do not have MyChart.  OR  You will get your results via MyChart    If a result is urgent, I will call to speak to you.    Vaccines:  ---- flu vaccine-declines        General recommendations:  Exercise-cardio 4-5d/wk 30min each day  Diet-Breakfast-toast (my favorite Hilda Hui Delighful Multigrain or Masood's Killer Bread Good Seed thin-sliced)/bagel/English muffin-whole wheat flour as a 1st ingredient or cereal/oatmeal/granola bar-fiber 4g or more or protein like eggs or peanut butter; optional veggies  Lunch-protein, 1/2c carb or 2 slices bread, veg 1c  Dinner-protein, fist sized carb, veg 1c  Fruit 2 a day  Dairy 2 a day-milk, soy milk, almond milk, cheese, yogurt, cottage cheese  Snacks-Protein-hard boiled egg, nuts (walnuts/almonds/pecans/pistachios 1/4c), hummus, beef/deer jerky  or meat sticks; vegetable, fruit, dairy-milk(1%, skim, almond, soy)/cheese (not a lot of cheddar)/yogurt (Greek is best-my favorite Dannon Fruit on the Bottom Greek)/cottage cheese 2%; triscuits/ popcorn/wheat thins have a lot of fiber; follow serving size on bag/box/container  increase water  Limit alcohol to 1 drink per day for women and 2 drinks per day for men (1 drink=12oz beer or 5oz wine or 1 1/2oz liquor)  Calcium: 500mg 1 twice a day if age 50 and younger and 600mg 1 twice a day if over age 50 (calcium citrate can be taken without food)  Vitamin D: 800-5000 IU/day  Limit salt to <2300mg a day if age 50 and under and <1500mg a day if over age 50/have high bp or diabetes or kidney disease  Recommend folate for childbearing age women 0.4mg per day (can be found in a multivitamin)  Recommend 18mg/dL of iron a day if age 50 and under and 8mg/dL a day if over age 50; take on an empty stomach at bedtime  Use sunscreen   Wear seatbelt  Recommend safe sex practices: using condoms everytime you have sex, discuss with a new partner about their past partners/history of STDs/drug use, avoid drinking alcohol or using drugs as this increases the chance that you will participate in high-risk sex, for oral sex help protect your mouth by having your partner use a condom (male or female), women should not douche after sex, be aware of your partner's body and your body-look for signs of a sore, blister, rash, or discharge, and have regular exams and periodic tests for STDs.  No distracted driving  No driving when under influence of substances  Wear a seatbelt  Eye dr every 1-2yrs  Dentist every 6-12 mon  Tetanus shot every 10yrs  Recommend flu vaccine in the fall  Appt in  1 year for physical      I will communicate with you via [employer] regarding messages and results. If you need help with this, you can call the support line at [phone].    IT WAS A PLEASURE TO SEE YOU TODAY. THANK YOU FOR CHOOSING US FOR YOUR HEALTHCARE  NEEDS.

## 2026-01-08 ENCOUNTER — APPOINTMENT (OUTPATIENT)
Dept: PRIMARY CARE | Facility: CLINIC | Age: 41
End: 2026-01-08
Payer: COMMERCIAL